# Patient Record
Sex: FEMALE | Race: BLACK OR AFRICAN AMERICAN | Employment: UNEMPLOYED | URBAN - METROPOLITAN AREA
[De-identification: names, ages, dates, MRNs, and addresses within clinical notes are randomized per-mention and may not be internally consistent; named-entity substitution may affect disease eponyms.]

---

## 2020-05-06 ENCOUNTER — VIRTUAL VISIT (OUTPATIENT)
Dept: SLEEP MEDICINE | Age: 40
End: 2020-05-06

## 2020-05-06 VITALS — BODY MASS INDEX: 50.02 KG/M2 | WEIGHT: 293 LBS | HEIGHT: 64 IN

## 2020-05-06 DIAGNOSIS — G47.33 OSA (OBSTRUCTIVE SLEEP APNEA): Primary | ICD-10-CM

## 2020-05-06 DIAGNOSIS — I10 ESSENTIAL HYPERTENSION: ICD-10-CM

## 2020-05-06 RX ORDER — LABETALOL 200 MG/1
400 TABLET, FILM COATED ORAL 2 TIMES DAILY
COMMUNITY

## 2020-05-06 NOTE — PROGRESS NOTES
[address] [place]. [place], [address]  Tel.  [phone]  Fax. 100 [facility] 60  [place], [address] [facility]  Tel.  [phone]  Fax. [phone] [address]inal[address] Cazares  Tel.  [phone]  Fax. [phone]         Subjective:    Elaina Fisher is a 36 y.o. female who was seen by synchronous (real-time) audio-video technology on 5/6/2020. Consent:  She is aware that this patient-initiated Telehealth encounter is a billable service, with coverage as determined by her insurance carrier. She is aware that she may receive a bill and has provided verbal consent to proceed: Yes    I was at home while conducting this encounter. Patient verified with 's License. She complains of snoring associated with periods of not breathing, excessive daytime sleepiness, awakening in the middle of the night because of SOB. Symptoms began several years ago, gradually worsening since that time. She usually can fall asleep in 5 minutes. Family or house members note snoring, periods of not breathing. She denies completely or partially paralyzed while falling asleep or waking up. Elaina Fisher does wake up frequently at night. She is not bothered by waking up too early and left unable to get back to sleep. She actually sleeps about 4-5 hours at night and wakes up about 2-3 times during the night. She does not work shifts:  . Norbert Redmond indicates she does get too little sleep at night. Her bedtime is variable. She awakens at 10:30 or 11:00 am. She does not take naps. She has the following observed behaviors:  She reports of punching and kicking in her sleep unaware of this happening but is tired the following mornings. Other remarks:        [place] Sleepiness Score: 12   and Modified F.O.S.Q. Score Total / 2: 14.5   which reflect improved sleep quality over therapy time.     No Known Allergies      Current Outpatient Medications:     labetaloL (NORMODYNE) 200 mg tablet, Take 400 mg by mouth two (2) times a day., Disp: , Rfl:      She  has a past medical history of Hypertension and Morbid obesity ([address] She  has a past surgical history that includes hx  section and hx left salpingo-oophorectomy (). She family history includes Diabetes in her father; Hypertension in her mother. She  reports that she has quit smoking. She has never used smokeless tobacco. She reports previous alcohol use. She reports that she does not use drugs. Review of Systems:  Constitutional:  No significant weight loss or weight gain  Eyes:  No blurred vision  CVS:  No significant chest pain  Pulm:  No significant shortness of breath  GI:  No significant nausea or vomiting  :  No significant nocturia  Musculoskeletal:  No significant joint pain at night  Skin:  No significant rashes  Neuro:  No significant dizziness   Psych:  No active mood issues    Sleep Review of Systems: notable for difficulty falling asleep; frequent awakenings at night;  regular dreaming noted; no nightmares ; early morning headaches;  memory problems; concentration issues; no history of any automobile or occupational accidents due to daytime drowsiness. Objective:     Visit Vitals  Ht 5' 4\" (1.626 m)   Wt 298 lb (135.2 kg)   BMI 51.15 kg/m²          Physical Exam completed by visual and auditory observation of patient with verbal input from patient.     General:   Alert, oriented, not in acute distress   Eyes:  Anicteric Sclerae; no obvious strabismus   Nose:  No obvious nasal septum deviation    Neck:   Midline trachea, no visible mass   Chest/Lungs:  Respiratory effort normal, no visualized signs of difficulty breathing or respiratory distress   CVS:  No JVD   Extremities:  No obvious rashes noted on face, neck, or hands   Neuro:  No facial asymmetry, no focal deficits; no obvious tremor    Psych:  Normal affect,  normal countenance       Assessment:       ICD-10-CM ICD-9-CM 1. NAILA (obstructive sleep apnea) G47.33 327.23 SLEEP STUDY UNATTENDED, 4 CHANNEL   2. Essential hypertension I10 401.9    3. BMI 50.0-59.9, adult [facility]) Z68.43 V85.43          Plan:        Sleep testing was ordered for initial evaluation.  She was provided information on sleep apnea including coresponding risk factors and the importance of proper treatment.  Treatment options if indicated were reviewed today. Patient agrees to a trial of PAP therapy if indicated.  Counseling was provided regarding proper sleep hygiene, appropriate sleep schedule, need for sleep environment safety and safe driving.  Recommended a dedicated weight loss program through appropriate diet and exercise regimen as significant weight reduction has been shown to reduce severity of obstructive sleep apnea. Patient's phone number [phone] (cell)  was reviewed and confirmed for accuracy. She gives permission for messages regarding results and appointments to be left at that number. Pursuant to the emergency declaration under the [facility], [facility] waiver authority and the [employer] Act, this Virtual Visit was conducted, with patient's consent, to reduce the patient's risk of exposure to COVID-19 and provide continuity of care for an established patient. Services were provided through a video synchronous discussion virtually to substitute for in-person clinic visit. Brody King MD, FAASM  Electronically signed.  05/06/20

## 2020-05-06 NOTE — PATIENT INSTRUCTIONS
[address] [facility] [place]. [address] [facility], [address] Tel.  [phone] Fax. 100 [facility] [address] [place], [address] [facility] Tel.  [phone] Fax. [phone] [address] [employer] Reginald Reilly Tel.  [phone] Fax. [phone] Sleep Apnea: After Your Visit Your Care Instructions Sleep apnea occurs when you frequently stop breathing for 10 seconds or longer during sleep. It can be mild to severe, based on the number of times per hour that you stop breathing or have slowed breathing. Blocked or narrowed airways in your nose, mouth, or throat can cause sleep apnea. Your airway can become blocked when your throat muscles and tongue relax during sleep. Sleep apnea is common, occurring in 1 out of 20 individuals. Individuals having any of the following characteristics should be evaluated and treated right away due to high risk and detrimental consequences from untreated sleep apnea: 
1. Obesity 2. Congestive Heart failure 3. Atrial Fibrillation 4. Uncontrolled Hypertension 5. Type II Diabetes 6. Night-time Arrhythmias 7. Stroke 8. Pulmonary Hypertension 9. High-risk Driving Populations (pilots, truck drivers, etc.) 10. Patients Considering Weight-loss Surgery How do you know you have sleep apnea? You probably have sleep apnea if you answer 'yes' to 3 or more of the following questions: S - Have you been told that you Snore? T - Are you often Tired during the day? O - Has anyone Observed you stop breathing while sleeping? P- Do you have (or are being treated for) high blood Pressure? B - Are you obese (Body Mass Index > 35)? A - Is your Age 48years old or older? N - Is your Neck size greater than 16 inches? G - Are you male Gender? A sleep physician can prescribe a breathing device that prevents tissues in the throat from blocking your airway.  Or your doctor may recommend using a dental device (oral breathing device) to help keep your airway open. In some cases, surgery may be needed to remove enlarged tissues in the throat. Follow-up care is a key part of your treatment and safety. Be sure to make and go to all appointments, and call your doctor if you are having problems. It's also a good idea to know your test results and keep a list of the medicines you take. How can you care for yourself at home? · Lose weight, if needed. It may reduce the number of times you stop breathing or have slowed breathing. · Go to bed at the same time every night. · Sleep on your side. It may stop mild apnea. If you tend to roll onto your back, sew a pocket in the back of your pajama top. Put a tennis ball into the pocket, and stitch the pocket shut. This will help keep you from sleeping on your back. · Avoid alcohol and medicines such as sleeping pills and sedatives before bed. · Do not smoke. Smoking can make sleep apnea worse. If you need help quitting, talk to your doctor about stop-smoking programs and medicines. These can increase your chances of quitting for good. · Prop up the head of your bed 4 to 6 inches by putting bricks under the legs of the bed. · Treat breathing problems, such as a stuffy nose, caused by a cold or allergies. · Use a continuous positive airway pressure (CPAP) breathing machine if lifestyle changes do not help your apnea and your doctor recommends it. The machine keeps your airway from closing when you sleep. · If CPAP does not help you, ask your doctor whether you should try other breathing machines. A bilevel positive airway pressure machine has two types of air pressureâone for breathing in and one for breathing out. Another device raises or lowers air pressure as needed while you breathe. · If your nose feels dry or bleeds when using one of these machines, talk with your doctor about increasing moisture in the air. A humidifier may help.  
· If your nose is runny or stuffy from using a breathing machine, talk with your doctor about using decongestants or a corticosteroid nasal spray. When should you call for help? Watch closely for changes in your health, and be sure to contact your doctor if: 
· You still have sleep apnea even though you have made lifestyle changes. · You are thinking of trying a device such as CPAP. · You are having problems using a CPAP or similar machine. Where can you learn more? Go to GÃ¼dpod. Enter F406 in the search box to learn more about \"Sleep Apnea: After Your Visit. \"  
© 0038-0093 Healthwise, Incorporated. Care instructions adapted under license by [facility] (which disclaims liability or warranty for this information). This care instruction is for use with your licensed healthcare professional. If you have questions about a medical condition or this instruction, always ask your healthcare professional. Asha Livers any warranty or liability for your use of this information. PROPER SLEEP HYGIENE What to avoid · Do not have drinks with caffeine, such as coffee or black tea, for 8 hours before bed. · Do not smoke or use other types of tobacco near bedtime. Nicotine is a stimulant and can keep you awake. · Avoid drinking alcohol late in the evening, because it can cause you to wake in the middle of the night. · Do not eat a big meal close to bedtime. If you are hungry, eat a light snack. · Do not drink a lot of water close to bedtime, because the need to urinate may wake you up during the night. · Do not read or watch TV in bed. Use the bed only for sleeping and sexual activity. What to try · Go to bed at the same time every night, and wake up at the same time every morning. Do not take naps during the day. · Keep your bedroom quiet, dark, and cool. · Get regular exercise, but not within 3 to 4 hours of your bedtime. Sidney Moore · Sleep on a comfortable pillow and mattress. · If watching the clock makes you anxious, turn it facing away from you so you cannot see the time. · If you worry when you lie down, start a worry book. Well before bedtime, write down your worries, and then set the book and your concerns aside. · Try meditation or other relaxation techniques before you go to bed. · If you cannot fall asleep, get up and go to another room until you feel sleepy. Do something relaxing. Repeat your bedtime routine before you go to bed again. · Make your house quiet and calm about an hour before bedtime. Turn down the lights, turn off the TV, log off the computer, and turn down the volume on music. This can help you relax after a busy day. Drowsy Driving The [place] [address] cites drowsiness as a causing factor in more than 861,683 police reported crashes annually, resulting in 76,000 injuries and 1,500 deaths. Other surveys suggest 55% of people polled have driven while drowsy in the past year, 23% had fallen asleep but not crashed, 3% crashed, and 2% had and accident due to drowsy driving. Who is at risk? Young Drivers: One study of drowsy driving accidents states that 55% of the drivers were under 25 years. Of those, 75% were male. Shift Workers and Travelers: People who work overnight or travel across time zones frequently are at higher risk of experiencing Circadian Rhythm Disorders. They are trying to work and function when their body is programed to sleep. Sleep Deprived: Lack of sleep has a serious impact on your ability to pay attention or focus on a task. Consistently getting less than the average of 8 hours your body needs creates partial or cumulative sleep deprivation. Untreated Sleep Disorders: Sleep Apnea, Narcolepsy, R.L.S., and other sleep disorders (untreated) prevent a person from getting enough restful sleep.  This leads to excessive daytime sleepiness and increases the risk for drowsy driving accidents by up to 7 times. Medications / Alcohol: Even over the counter medications can cause drowsiness. Medications that impair a drivers attention should have a warning label. Alcohol naturally makes you sleepy and on its own can cause accidents. Combined with excessive drowsiness its effects are amplified. Signs of Drowsy Driving: * You don't remember driving the last few miles * You may drift out of your neela * You are unable to focus and your thoughts wander * You may yawn more often than normal 
 * You have difficulty keeping your eyes open / nodding off * Missing traffic signs, speeding, or tailgating Prevention-  
Good sleep hygiene, lifestyle and behavioral choices have the most impact on drowsy driving. There is no substitute for sleep and the average person requires 8 hours nightly. If you find yourself driving drowsy, stop and sleep. Consider the sleep hygiene tips provided during your visit as well. Medication Refill Policy: Refills for all medications require 1 week advance notice. Please have your pharmacy fax a refill request. We are unable to fax, or call in \"controled substance\" medications and you will need to pick these prescriptions up from our office. [employer] Activation Thank you for requesting access to [employer]. Please follow the instructions below to securely access and download your online medical record. [employer] allows you to send messages to your doctor, view your test results, renew your prescriptions, schedule appointments, and more. How Do I Sign Up? 1. In your internet browser, go to [email or web address] 2. Click on the First Time User? Click Here link in the Sign In box. You will see the New Member Sign Up page. 3. Enter your [employer] Access Code exactly as it appears below. You will not need to use this code after youve completed the sign-up process.  If you do not sign up before the expiration date, you must request a new code. [employer] Access Code: [email or web address] Expires: 2020  4:12 PM (This is the date your [employer] access code will ) 4. Enter the last four digits of your Social Security Number (xxxx) and Date of Birth (mm/dd/yyyy) as indicated and click Submit. You will be taken to the next sign-up page. 5. Create a [employer] ID. This will be your [employer] login ID and cannot be changed, so think of one that is secure and easy to remember. 6. Create a [employer] password. You can change your password at any time. 7. Enter your Password Reset Question and Answer. This can be used at a later time if you forget your password. 8. Enter your e-mail address. You will receive e-mail notification when new information is available in [address] 9. Click Sign Up. You can now view and download portions of your medical record. 10. Click the Download Summary menu link to download a portable copy of your medical information. Additional Information If you have questions, please call [phone]. Remember, [employer] is NOT to be used for urgent needs. For medical emergencies, dial 911.

## 2020-10-20 ENCOUNTER — DOCUMENTATION ONLY (OUTPATIENT)
Dept: SLEEP MEDICINE | Age: 40
End: 2020-10-20

## 2020-10-28 ENCOUNTER — HOSPITAL ENCOUNTER (OUTPATIENT)
Dept: SLEEP MEDICINE | Age: 40
Discharge: HOME | End: 2020-10-28
Payer: MEDICAID

## 2020-10-28 PROCEDURE — 95806 SLEEP STUDY UNATT&RESP EFFT: CPT | Performed by: INTERNAL MEDICINE

## 2020-11-03 ENCOUNTER — TELEPHONE (OUTPATIENT)
Dept: SLEEP MEDICINE | Age: 40
End: 2020-11-03

## 2020-11-03 DIAGNOSIS — G47.34 NOCTURNAL HYPOXEMIA: ICD-10-CM

## 2020-11-03 DIAGNOSIS — G47.33 OSA (OBSTRUCTIVE SLEEP APNEA): Primary | ICD-10-CM

## 2020-11-04 NOTE — TELEPHONE ENCOUNTER
Meg Dasilva is to be contacted by lead sleep technologist regarding results of Sleep Testing which was indicative of an average AHI of 81 per hour with an SpO2 alen of 57% and SpO2 of < 88% being 33 minutes. An APAP prescription has been written and oximetry testing in 2 weeks after patient has been on PAP therapy. Patient will be contacted by office staff regarding follow-up  in 2-3 months after initiation of therapy. Encounter Diagnoses   Name Primary?  NAILA (obstructive sleep apnea) Yes    Nocturnal hypoxemia        Orders Placed This Encounter    AMB SUPPLY ORDER     Diagnosis: Obstructive Sleep Apnea ICD-10 Code (G47.33)    Positive Airway Pressure Therapy: Duration of need: 99 months. ResMed APAP Device with Heated Humidifer [id number] / [id number]. Minimum Pressure: 4 cmH2O, Maximum Pressure: 20 cmH2O.  Nasal Cushion (Replace) 2 per month.  Nasal Interface Mask 1 every 3 months.  Headgear 1 every 6 months.  Filter(s) Disposable 2 per month.  Filter(s) Non-Disposable 1 every 6 months. [address] for [facility] Ronnell (Replace) 1 every 6 months.  Tubing with heating element 1 every 3 months. Perform Mask Fitting per patient preference and comfort - replace as above. Nocturnal Oximetry on PAP without supplemental oxygen. Testing to be performed in 2 weeks after set-up. Pérez Weber MD, FAASM; NPI: [id number]  Electronically signed. 11/03/20

## 2020-11-11 ENCOUNTER — DOCUMENTATION ONLY (OUTPATIENT)
Dept: SLEEP MEDICINE | Age: 40
End: 2020-11-11

## 2021-02-11 ENCOUNTER — TELEPHONE (OUTPATIENT)
Dept: SLEEP MEDICINE | Age: 41
End: 2021-02-11

## 2021-02-11 NOTE — TELEPHONE ENCOUNTER
Left voicemail on both cell phone and home phone number to schedule with Dr. Nacho Pederson for a follow up.

## 2022-01-01 ENCOUNTER — HOSPITAL ENCOUNTER (EMERGENCY)
Age: 42
End: 2022-05-26
Attending: EMERGENCY MEDICINE
Payer: MEDICAID

## 2022-01-01 DIAGNOSIS — I46.9 CARDIAC ARREST (HCC): Primary | ICD-10-CM

## 2022-01-01 DIAGNOSIS — I46.9 ASYSTOLE (HCC): ICD-10-CM

## 2022-01-01 DIAGNOSIS — I46.9 PEA (PULSELESS ELECTRICAL ACTIVITY) (HCC): ICD-10-CM

## 2022-01-01 PROCEDURE — 99281 EMR DPT VST MAYX REQ PHY/QHP: CPT

## 2022-01-01 PROCEDURE — 92950 HEART/LUNG RESUSCITATION CPR: CPT

## 2022-01-01 PROCEDURE — 99285 EMERGENCY DEPT VISIT HI MDM: CPT

## 2022-05-26 NOTE — PROGRESS NOTES
Spiritual Care Assessment/Progress Note  [facility]      NAME: Henry Sandhu      MRN: [id number]  AGE: 43 y.o. SEX: female  Hinduism Affiliation: Unknown   Language: English     5/25/2022     Total Time (in minutes): 140     Spiritual Assessment begun in [facility] EMERGENCY DEPT through conversation with:         []Patient        [x] Family    [] Friend(s)        Reason for Consult: Death, ER     Spiritual beliefs: (Please include comment if needed)     [] Identifies with a uyly tradition:         [] Supported by a yuly community:            [] Claims no spiritual orientation:           [] Seeking spiritual identity:                [x] Adheres to an individual form of spirituality:           [] Not able to assess:                           Identified resources for coping:      [x] Prayer                               [] Music                  [] Guided Imagery     [x] Family/friends                 [] Pet visits     [] Devotional reading                         [] Unknown     [] Other:                                              Interventions offered during this visit: (See comments for more details)    Patient Interventions: Death, Outpatient     Family/Friend(s):  Affirmation of emotions/emotional suffering,Affirmation of yuly,Catharsis/review of pertinent events in supportive environment,Coping skills reviewed/reinforced,Iconic (affirming the presence of God/Higher Power),Normalization of emotional/spiritual concerns,Prayer (assurance of),Hinduism beliefs/image of God discussed     Plan of Care:     [] Support spiritual and/or cultural needs    [] Support AMD and/or advance care planning process      [x] Support grieving process   [] Coordinate Rites and/or Rituals    [] Coordination with community clergy   [] No spiritual needs identified at this time   [] Detailed Plan of Care below (See Comments)  [] Make referral to Music Therapy  [] Make referral to Pet Therapy     [] Make referral to Addiction services  [] Make referral to [facility]  [] Make referral to Spiritual Care Partner  [] No future visits requested        [] Contact Spiritual Care for further referrals     Comments:   Responded to page to ER for patient in cardiac arrest.  Patient  prior to 's arrival.  Patient's , Jorge Jimenez,  and father arrived shortly after. Accompanied Dr. Jona Nick to notify family. Remained with family as they expressed thoughts and emotions about the sudden and unexpected loss. Patient's father had to step outside. Jorge Jimenez shared that they have two children- a 15year old daughter and a 8year old son; he has no idea how to tell them their mother is gone. Jorge Jimenez also shared about financial stressors the family has been facing recently. He was about to graduate with his bachelor's in nursing and his wife was graduating with degrees to teach and to work in chaplaincy. He shared her favorite psalm verse from [address] \"You are fearfully and wonderfully made\".  recited other verses from same psa and encouraged  to find solace in that psalm. When he was ready, accompanied him to his wife's bedside. As he wanted privacy, I waited at nurse's station then accompanied him to PADMINI hurd. He will need time to consider a  home.        VAL Olivo, [facility], Staff [address] [facility] Paging Service  [phone])

## 2022-05-26 NOTE — ED NOTES
Araceli called at [phone]. Darcus Libman spoke with nurse. Per Serafin kelsey, she will call back within an hour to get next of kin information.

## 2022-05-26 NOTE — ED PROVIDER NOTES
EMERGENCY DEPARTMENT HISTORY AND PHYSICAL EXAM    Please note that this dictation was completed with [employer], the computer voice recognition software. Quite often unanticipated grammatical, syntax, homophones, and other interpretive errors are inadvertently transcribed by the computer software. Please disregard these errors. Please excuse any errors that have escaped final proofreading. Date: 5/25/2022  Patient Name: Donald Castellanos  Patient Age and Sex: 80 y.o. female    History of Presenting Illness         History Provided By: ems, family    Chief Complaint: cardiac arrest      HPI: Donald Castellanos, is 35yo F with unknown medical history who presents to the ED via ambulance after witnessed cardiac arrest at home. Per EMS report, patient was in her usual state of health when she suddenly became stiff, fell onto the floor and no longer was responsive. Her  was the one who witnessed the event, he immediately came to her side, made attempts to wake her up. When he realized that she was unconscious he called 911 and started administering cpr. This was appx 8:35pm  EMS arrived at the scene, found patient to be in vfib, no pulse. Delivered one discharge from the defibrillator after which she went into asystole. They started resuscitation with immediate chest compressions, gave 4 rounds of epi en route to the hospital and secured airway with LMA. Patient remained in asystole with no pulse. Further history obtained later by  and father:  Patient's name is [address] [place] [address] history: morbid obesity, recently started smoking cigarettes, no drug use, no extensive etoh use. He is not sure if she has any other medical problems because she \"does not believe in [address] [place] [address] medicine\" and therefore did not follow up with a doctor regularly. He denies a history of heart disease, states that she never had a heart attack before. Not on dialysis.  Was diagnosed with HTN some years ago and was on medications on/off for a while. They have two teenage kids. They were both enrolled in a bachelor's degree program, she in Pastoral care. She was scheduled to graduate this summer. He says that his wife was doing fine earlier today. He recalls her sending a message off on [employer] and while typing the message she appeared to get stiff/rigid for afew seconds, fell to the ground and no longer was responsive. Of note, patient's  does not wish a autopsy as it is per patient's own wishes not to have one. Pt denies any other alleviating or exacerbating factors. No other associated signs or symptoms. There are no other complaints, changes or physical findings at this time. PCP: No primary care provider on file. Past History   All documented elements of the [facility] reviewed and verified by me. -Celina Ambrose MD    Past Medical History:  Past Medical History:   Diagnosis Date    Morbid obesity [facility])        Past Surgical History:  History reviewed. No pertinent surgical history. Family History:   History reviewed. No pertinent family history. Social History:  Social History     Tobacco Use    Smoking status: Current Every Day Smoker    Smokeless tobacco: Not on file   Substance Use Topics    Alcohol use: Not Currently    Drug use: Not Currently       Current Medications:  No current facility-administered medications on file prior to encounter. No current outpatient medications on file prior to encounter. Allergies:  Not on File    Review of Systems   All other systems reviewed and negative    Review of Systems   Unable to perform ROS: Patient unresponsive       Physical Exam   Reviewed patients vital signs and nursing note    Physical Exam  Constitutional:       Appearance: She is morbidly obese. Interventions: She is intubated. Comments: Temporary airway in place with good capnography   HENT:      Head: Atraumatic. Nose: Nose normal.      Mouth/Throat:      Mouth: Mucous membranes are dry. Eyes:      Conjunctiva/sclera: Conjunctivae normal.      Comments: Pupils normal size, not reactive   Neck:      Comments: No distention  Cardiovascular:      Comments: No pulse  Pulmonary:      Effort: She is intubated. Breath sounds: No wheezing. Comments: Airway not edematous. Being bagged with ease. Shows appropriate bilateral lung expansion  Abdominal:      Comments: No contusions, ecchymosis, lacerations. abd soft   Musculoskeletal:         General: No deformity. Skin:     Coloration: Skin is pale. Skin is not jaundiced. Findings: No bruising or rash. Neurological:      GCS: GCS eye subscore is 1. GCS verbal subscore is 1. GCS motor subscore is 1. Diagnostic Study Results     Labs - I have personally reviewed and interpreted all laboratory results. Interpretation of available and pertinent results detailed below in MDM. Fred Merchant MD, MSc  No results found for this or any previous visit (from the past 24 hour(s)). Radiologic Studies - I have personally reviewed and interpreted (see MDM for brief interpreation of available results) all imaging studies and agree with radiology interpretation and report. - Fred Merchant MD, MSc  No orders to display         Medical Decision Making   I am the first provider for this patient. Records Reviewed:   I reviewed our electronic medical record system for any past medical records that were available that may contribute to the patient's current condition, including their PMH, surgical history, social and family history. This includes most recent ED visits, any available discharge summaries and prior ECGs, which I have reviewed and interpreted personally. I have summarized most salient findings in my HPI and MDM. Fred Merchant MD, MSc    I also reviewed the nursing notes and vital signs from today's visit. Nursing notes will be reviewed as they become available in realtime while the pt has been in the ED.    Fred Merchant MD, MSc      Vital Signs-Reviewed the patient's vital signs. Cardiac Monitor: Cardiac monitor interpreted by me. The cardiac monitor revealed scant electrical activity but no pulse or cardiac activity visualized on ultrasound. Consistent with PEA. The cardiac monitor was ordered to monitor patient for signs of cardiac dysrhythmia, which they are at risk for based on their history or risk for cardiovascular disease and/or metabolic abnormalities. Becky Whitfield MD MSc      Provider Notes (Medical Decision Making):   Assessment: Witnessed cardiac arrest at home, cpr initiated by  shortly after patient lost consciousness. This was appx 8:35 pm.    Ddx: massive mi, cardiac conduction abnormality, metabolic abnormality, hypoxemia, hypercapnia, massive PE  Timeline:       8:35pm- sudden loc and patient unresponsive,  initiated cpr  845 pm - Ems at scene found patient to be in vfib, confirmed no pulse, shocked x 1, patient went into asystole. Started cpr immediately, secured airway, continued resuscitation per acls protocol.  - en route to the ER, patient was given 4 rounds of epi, never had a pulse and remained in asystole. 857pm - arrives in the ed. Remains unresponsive and without a pulse. - Please review nurses notes for details of the event. Course of code included:  - resumed cpr with minimal disruptions for pulse checks etc  - check of airway, confirmed that it is secure. Respiratory therapy bagging patient during resuscitation. Bilateral chest rise, no resistance during bagging.  - continued cpr and resuscitation per ACLS guidelines. - 4 additional rounds of epi  - bicarb  - review Hs and Tx  - no dialysis catheter to suggest need for calcium, patient not severely dehydrated, being oxygenated and ventilated  - cardiac monitor showed few spikes of electrical activity.  However, patient despite our efforts, the patient never regained pulse and never had any visible cardiac activity on beside US during any point of her resuscitation. - total downtime with no pulse and no cardiac activity: 40 min at a minimum.  - reversible causes of PEA arrest were reviewed twice and no other found that we had not addressed    After 40 min of cardiac arrest with no shockable rhythm, no pulse at any point, no cardiac activity at any point, I resuscitation efforts were terminad as continuing them would be futile and unlikely yield any meaningful benefits to the patient. Procedure Note - Bedside Ultrasound:  1:14 PM  Performed by: dr Ronda Frederick  Cardiac ultrasound performed at beside, showing no visible cardiac activity during pulse checks and at end of resuscitation. .   The procedure took 15-30 min. Lyn Christopher MD      Procedure Note - CPR Efforts:   1:14 PM  I supervised and directed all CPR efforts. Procedure Time: 20 minutes  Lyn Christopher MD      CRITICAL CARE NOTE :    IMPENDING DETERIORATION -Airway, Respiratory, Cardiovascular and CNS  ASSOCIATED RISK FACTORS - Shock, Vascular Compromise and CNS Decompensation  MANAGEMENT- Bedside Assessment and Supervision of Care  INTERPRETATION -  Cardiac Output Measures   INTERVENTIONS - hemodynamic mngmt  CASE REVIEW - Nursing and Family  TREATMENT RESPONSE -Unchanged   PERFORMED BY - Self    NOTES   :    I have spent 45 minutes of critical care time involved in lab review, consultations with specialist, family decision- making, bedside attention and documentation. During this entire length of time I was immediately available to the patient . Critical Care: The reason for providing this level of medical care for this critically ill patient was due to a critical illness that impaired one or more vital organ systems, such that there was a high probability of imminent or life threatening deterioration in the patient's condition.  This care involved high complexity decision making to assess, manipulate, and support vital system functions, to treat this degree of vital organ system failure, and to prevent further life threatening deterioration of the patients condition. Grace Jimenez MD    Date and time of Death: 5/25/2022 at 9:15 pm    I, Joyce Diaz MD, am the attending of record for this patient encounter. Diagnosis     Clinical Impression:   1. Cardiac arrest ([address]    2. PEA (Pulseless electrical activity) ([address]        Attestation:  I personally performed the services described in this documentation on this date 5/25/2022 for patient Loc Castellanos.   Grace Jimenez MD

## 2022-05-26 NOTE — ED NOTES
Pt arrived in  Room 21 at 2101  - Dr. Jason Herbert and Dr Adrienne Urbano present in room getting report from EMS while CPR in progess by ER techs. Per EMS, pt was in vfib upon EMS arrivan, defibbed [address] and pt went asytole. Given 4 rounds of epi, 2 of narcan. Pt arrived with 22 G on r wrist, IO in left lower extremity. 2103: epi given, compression in progress  2104: 1 amp of bicarb given  2105: CPR held/Pulse check, no pulse, PEA  2105: CPR resumed/20G IV inserted in left hand  2106: epi given  2107: CPR held/Pulse check/PEA  2107: CPR resumed  2109: Epi given/heart US by Dr Jason Herbert  2110: CPR paused/PEA/ resumed CPR  2112: Epi given  2114: CPR in progress/team briefing on wether to keep going and/reviewing Hs&Ts. 2115: Dr Jason Herbert called time of death.

## 2022-05-26 NOTE — ED NOTES
ME called by nurse. Dr Martin Dural states family will not do autopsy of ME. Notified ME investigator Gage Ace.